# Patient Record
(demographics unavailable — no encounter records)

---

## 2024-12-16 NOTE — PHYSICAL EXAM
[FreeTextEntry1] : Patient is awake and alert.  Pleasant cooperative with the exam.  Patient reports some limitation and pain turning to the left no difficulty turning to the right Face is symmetric tongue and uvula midline and symmetric extraocular movements are intact speech is clear and understandable No resting or postural tremors are seen.  Mild intention tremor right worse than left was seen at last visit not seen today Bradykinesia hand opening and closing Grade 1 on the left normal on the right Rapid alternating movements are normal bilaterally Finger taps slightly reduced in the left upper extremity No rigidity in the upper extremities Unable to dorsiflex the left ankle.  Toes are quite crunched including the big toe, no abnormal posturing seen at the ankle Strength is grossly intact No difficulty getting up from the chair
unable to assess

## 2024-12-16 NOTE — PROCEDURE
[FreeTextEntry1] : Area to be injected was prepped with alcohol wipes, 200 units of Botox were mixed in 1 cc of normal saline I injected as follows Left flexor digitorum brevis 70 units Left flexor digitorum longus 80 units left tibialis posterior 50 Total injected 200 units wasted = 0

## 2024-12-16 NOTE — DISCUSSION/SUMMARY
[FreeTextEntry1] : This is a 67-year-old female who presents with chief complaints of spasms and dystonic posturing of the left foot.  Over time spasms of involved somewhat right leg also.  She reports difficulty with neck turning to the left and occasional head tremor.  Has noticed some freezing while writing with her right hand Brain spinal cord imaging, EMG and dystonia genetic markers was negative She has had some response to Sinemet about 70%, severe flexion of the toes of left foot is seen.  Slight limitation of range of motion turning to the left.  Mild bradykinesia to finger taps on the left hand and bilateral intention tremor which is also very mild.  Currently patient is on Sinemet 1 tablet 3 times a day she complains of postural tremor with kinetic component bilaterally possibly drug induced in the setting of SSRIs FDOPA Scan normal Impression dystonia, ? parkinsonism   Plan Botox injected as above She has been off of levodopa for more than 5 months  EMG follow-up with hand Ortho for bilateral carpal tunnel syndrome.  Advised to wear wrist splints Follow up in 3 months for Botox injections We discussed the above impression, plan and recommendation during the visit. Counseling represented more than 50% of the 30-minute visit time

## 2024-12-16 NOTE — HISTORY OF PRESENT ILLNESS
[FreeTextEntry1] : This is a 66-year-old right-handed female who presents with chief complaints of spasms.  She is kindly referred by Dr. Valiente to the movement disorders clinic Patient has seen several neurologists prior to this Patient reports that in 2011 she started experiencing spasms in the left calf.  She underwent extensive work-up including MRI of the brain and spine, EMG and also a complete dystonia work-up.  Work-up was all negative.  Within about 3 months of onset of the spasms in her calf she noted that she developed hammertoes in her left foot.  Over time symptoms have progressed and symptoms involve the right leg.  Today patient felt that when she was writing her right hand was freezing.  She also has difficulty turning her neck to the left side and needs to turn her whole body.  She notices occasional head tremor. She felt that carbidopa levodopa which was started about a year and a half ago has helped her somewhat. She has tried Botox injections in 2013 had about 3 sessions and did find them to be helpful but never pursued it. Patient reports occasional nighttime drooling some vivid dreams but no REM behavior disorder.  Handwriting has become sloppy and she feels overall slow especially with household work. She denies anosmia denies any urinary issues or blood pressure issues.  States that memory is mildly affected there are no hallucinations and has constipation  Review of systems a complete review of systems is performed and is negative except as listed in HPI  Interval history September 25, 2023.  Patient is seen in follow-up today.  She is accompanied by her best friend.  States that she did not notice any improvement with Botox injections.  Still continues to feel the crunching of the toes.  The spasms may be slightly less but she is not very sure.  Friend states that she still has the tremors.  Patient states that she is on Artane 2 mg twice a day and she feels more anxious and also like she has more reflux.  She continues to be on Sinemet 1 tablet 3 times a day  Interval History March 18th, 2024 Since last visit, noticed tremor in both arms going on since last summer. she spills food and drinks because of tremor, she has noticed changes in her handwriting as well. She states the botox helped with the foot dystonia.  she could not get her last botox injections because of covid infection, had to cancel the appointment. Complains of constipation, takes miralax, has Bm every 2 days. has good memory and mood. denies any sleep issues.  Interval history June 17 , 2024 no side effects from the botox injections, peaks within one week and helps for two months, starts to wear off in 2 weeks before the next dose.  btx helps with the toe curling, movements of the foot and walking. the tremor are the same , occasionally spills food or drinks because of the tremor. had two falls in the last two weeks, states she got nervous in front of too many people.   Sinemet one tab three times a day  diazepam 5mg twice a day  duloextin 60mg   Interval history September 16, 2024.  Patient presents to follow-up on foot dystonia.  She states that about 2 months ago she ran out of prescription of carbidopa levodopa and has not been on that medication for quite some time.  She actually feels that her handwriting is better without levodopa.  The tremors are still unchanged and are not bothersome to her.  She also finds that the last Botox injection worked really well and now she is able to walk longer distances without using her cane.  She is very happy with it and wishes to have repeat injections today  Interval history December 16, 2024.  Patient is happy with the results of Botox injection for left foot dystonia.  She states that it does wear off before the 3 months but while it is working it is helpful.  She has been off of levodopa and is noticing that she is having more numbness in her hands.  She reports that she does have history of bilateral carpal tunnel syndrome and did receive injections in the past but this has been a long time ago.  She also has the wrist splints at home which she has currently not been using.

## 2025-03-17 NOTE — PHYSICAL EXAM
[FreeTextEntry1] : Patient is awake and alert.  Pleasant cooperative with the exam.  Patient reports some limitation and pain turning to the right, no difficulty turning to the Left Face is symmetric tongue and uvula midline and symmetric extraocular movements are intact speech is clear and understandable No resting or postural tremors are seen.  Mild intention tremor right worse than left was seen at last visit not seen today Bradykinesia hand opening and closing Grade 1 on the left normal on the right Rapid alternating movements are normal bilaterally Finger taps slightly reduced in the left upper extremity No rigidity in the upper extremities Unable to dorsiflex the left ankle.  Toes are quite crunched including the big toe, no abnormal posturing seen at the ankle Strength is grossly intact No difficulty getting up from the chair
Statement Selected

## 2025-03-17 NOTE — HISTORY OF PRESENT ILLNESS
[FreeTextEntry1] : This is a 66-year-old right-handed female who presents with chief complaints of spasms.  She is kindly referred by Dr. Valiente to the movement disorders clinic Patient has seen several neurologists prior to this Patient reports that in 2011 she started experiencing spasms in the left calf.  She underwent extensive work-up including MRI of the brain and spine, EMG and also a complete dystonia work-up.  Work-up was all negative.  Within about 3 months of onset of the spasms in her calf she noted that she developed hammertoes in her left foot.  Over time symptoms have progressed and symptoms involve the right leg.  Today patient felt that when she was writing her right hand was freezing.  She also has difficulty turning her neck to the left side and needs to turn her whole body.  She notices occasional head tremor. She felt that carbidopa levodopa which was started about a year and a half ago has helped her somewhat. She has tried Botox injections in 2013 had about 3 sessions and did find them to be helpful but never pursued it. Patient reports occasional nighttime drooling some vivid dreams but no REM behavior disorder.  Handwriting has become sloppy and she feels overall slow especially with household work. She denies anosmia denies any urinary issues or blood pressure issues.  States that memory is mildly affected there are no hallucinations and has constipation  Review of systems a complete review of systems is performed and is negative except as listed in HPI  Interval history September 25, 2023.  Patient is seen in follow-up today.  She is accompanied by her best friend.  States that she did not notice any improvement with Botox injections.  Still continues to feel the crunching of the toes.  The spasms may be slightly less but she is not very sure.  Friend states that she still has the tremors.  Patient states that she is on Artane 2 mg twice a day and she feels more anxious and also like she has more reflux.  She continues to be on Sinemet 1 tablet 3 times a day  Interval History March 18th, 2024 Since last visit, noticed tremor in both arms going on since last summer. she spills food and drinks because of tremor, she has noticed changes in her handwriting as well. She states the botox helped with the foot dystonia.  she could not get her last botox injections because of covid infection, had to cancel the appointment. Complains of constipation, takes miralax, has Bm every 2 days. has good memory and mood. denies any sleep issues.  Interval history June 17 , 2024 no side effects from the botox injections, peaks within one week and helps for two months, starts to wear off in 2 weeks before the next dose.  btx helps with the toe curling, movements of the foot and walking. the tremor are the same , occasionally spills food or drinks because of the tremor. had two falls in the last two weeks, states she got nervous in front of too many people.   Sinemet one tab three times a day  diazepam 5mg twice a day  duloextin 60mg   Interval history September 16, 2024.  Patient presents to follow-up on foot dystonia.  She states that about 2 months ago she ran out of prescription of carbidopa levodopa and has not been on that medication for quite some time.  She actually feels that her handwriting is better without levodopa.  The tremors are still unchanged and are not bothersome to her.  She also finds that the last Botox injection worked really well and now she is able to walk longer distances without using her cane.  She is very happy with it and wishes to have repeat injections today  Interval history December 16, 2024.  Patient is happy with the results of Botox injection for left foot dystonia.  She states that it does wear off before the 3 months but while it is working it is helpful.  She has been off of levodopa and is noticing that she is having more numbness in her hands.  She reports that she does have history of bilateral carpal tunnel syndrome and did receive injections in the past but this has been a long time ago.  She also has the wrist splints at home which she has currently not been using.  Interval history March 17, 2025.  Patient states that she is happy with the results of the Botox seems to last longer.  She states that sometimes when she turns her head to the right side she feels a pull in the left SCM region.  Does not find it very bothersome.  States that the carpal tunnel like symptoms have completely resolved.  Denies any side effects wishes to have repeat Botox injections today

## 2025-03-17 NOTE — DISCUSSION/SUMMARY
[FreeTextEntry1] : This is a 67-year-old female who presents with chief complaints of spasms and dystonic posturing of the left foot.  Over time spasms of involved somewhat right leg also.  She reports difficulty with neck turning to the  R and occasional head tremor.  Has noticed some freezing while writing with her right hand Brain spinal cord imaging, EMG and dystonia genetic markers was negative She has had some response to Sinemet about 70%, severe flexion of the toes of left foot is seen.  Slight limitation of range of motion turning to the R.  Mild bradykinesia to finger taps on the left hand and bilateral intention tremor which is also very mild.  she complains of postural tremor with kinetic component bilaterally possibly drug induced in the setting of SSRIs FDOPA Scan normal Impression dystonia, ? parkinsonism   Plan Botox injected as above She has been off of levodopa  declines EMG follow-up with hand Ortho for bilateral carpal tunnel syndrome - sx have resolved, had recd steroids inj in past   Follow up in 3 months for Botox injections We discussed the above impression, plan and recommendation during the visit. Counseling represented more than 50% of the 30-minute visit time

## 2025-07-08 NOTE — DISCUSSION/SUMMARY
[FreeTextEntry1] : This is a 68-year-old female who presents with chief complaints of spasms and dystonic posturing of the left foot.  Over time spasms of involved somewhat right leg also.  She reports difficulty with neck turning to the  R and occasional head tremor.  Has noticed some freezing while writing with her right hand Brain spinal cord imaging, EMG and dystonia genetic markers was negative She has had some response to Sinemet about 70%, severe flexion of the toes of left foot is seen.  Slight limitation of range of motion turning to the R.  Mild bradykinesia to finger taps on the left hand and bilateral intention tremor which is also very mild.  she complains of postural tremor with kinetic component bilaterally possibly drug induced in the setting of SSRIs FDOPA Scan normal Impression dystonia, ? parkinsonism   Plan Botox injected as above She has been off of levodopa  Follow up in 3 months for Botox injections We discussed the above impression, plan and recommendation during the visit. Counseling represented more than 50% of the 30-minute visit time

## 2025-07-08 NOTE — PHYSICAL EXAM
[FreeTextEntry1] : Patient is awake and alert.  Pleasant cooperative with the exam.  Patient reports some limitation and pain turning to the right, no difficulty turning to the Left Face is symmetric tongue and uvula midline and symmetric extraocular movements are intact speech is clear and understandable No resting or postural tremors are seen.  Mild intention tremor right worse than left was seen at last visit not seen today Bradykinesia hand opening and closing Grade 1 on the left normal on the right Rapid alternating movements are normal bilaterally Finger taps slightly reduced in the left upper extremity No rigidity in the upper extremities Unable to dorsiflex the left ankle.  Toes are quite crunched including the big toe, no abnormal posturing seen at the ankle Strength is grossly intact No difficulty getting up from the chair

## 2025-07-08 NOTE — HISTORY OF PRESENT ILLNESS
[FreeTextEntry1] : This is a 66-year-old right-handed female who presents with chief complaints of spasms.  She is kindly referred by Dr. Valiente to the movement disorders clinic Patient has seen several neurologists prior to this Patient reports that in 2011 she started experiencing spasms in the left calf.  She underwent extensive work-up including MRI of the brain and spine, EMG and also a complete dystonia work-up.  Work-up was all negative.  Within about 3 months of onset of the spasms in her calf she noted that she developed hammertoes in her left foot.  Over time symptoms have progressed and symptoms involve the right leg.  Today patient felt that when she was writing her right hand was freezing.  She also has difficulty turning her neck to the left side and needs to turn her whole body.  She notices occasional head tremor. She felt that carbidopa levodopa which was started about a year and a half ago has helped her somewhat. She has tried Botox injections in 2013 had about 3 sessions and did find them to be helpful but never pursued it. Patient reports occasional nighttime drooling some vivid dreams but no REM behavior disorder.  Handwriting has become sloppy and she feels overall slow especially with household work. She denies anosmia denies any urinary issues or blood pressure issues.  States that memory is mildly affected there are no hallucinations and has constipation  Review of systems a complete review of systems is performed and is negative except as listed in HPI  Interval history September 25, 2023.  Patient is seen in follow-up today.  She is accompanied by her best friend.  States that she did not notice any improvement with Botox injections.  Still continues to feel the crunching of the toes.  The spasms may be slightly less but she is not very sure.  Friend states that she still has the tremors.  Patient states that she is on Artane 2 mg twice a day and she feels more anxious and also like she has more reflux.  She continues to be on Sinemet 1 tablet 3 times a day  Interval History March 18th, 2024 Since last visit, noticed tremor in both arms going on since last summer. she spills food and drinks because of tremor, she has noticed changes in her handwriting as well. She states the botox helped with the foot dystonia.  she could not get her last botox injections because of covid infection, had to cancel the appointment. Complains of constipation, takes miralax, has Bm every 2 days. has good memory and mood. denies any sleep issues.  Interval history June 17 , 2024 no side effects from the botox injections, peaks within one week and helps for two months, starts to wear off in 2 weeks before the next dose.  btx helps with the toe curling, movements of the foot and walking. the tremor are the same , occasionally spills food or drinks because of the tremor. had two falls in the last two weeks, states she got nervous in front of too many people.   Sinemet one tab three times a day  diazepam 5mg twice a day  duloextin 60mg   Interval history September 16, 2024.  Patient presents to follow-up on foot dystonia.  She states that about 2 months ago she ran out of prescription of carbidopa levodopa and has not been on that medication for quite some time.  She actually feels that her handwriting is better without levodopa.  The tremors are still unchanged and are not bothersome to her.  She also finds that the last Botox injection worked really well and now she is able to walk longer distances without using her cane.  She is very happy with it and wishes to have repeat injections today  Interval history December 16, 2024.  Patient is happy with the results of Botox injection for left foot dystonia.  She states that it does wear off before the 3 months but while it is working it is helpful.  She has been off of levodopa and is noticing that she is having more numbness in her hands.  She reports that she does have history of bilateral carpal tunnel syndrome and did receive injections in the past but this has been a long time ago.  She also has the wrist splints at home which she has currently not been using.  Interval history July 7, 2025.  Patient states that she is happy with the results of the Botox seems to last longer.  She states that sometimes when she turns her head to the right side she feels a pull in the left SCM region.  Does not find it very bothersome.  States that the carpal tunnel like symptoms have completely resolved.  Denies any side effects wishes to have repeat Botox injections today